# Patient Record
Sex: FEMALE | Race: WHITE | NOT HISPANIC OR LATINO
[De-identification: names, ages, dates, MRNs, and addresses within clinical notes are randomized per-mention and may not be internally consistent; named-entity substitution may affect disease eponyms.]

---

## 2020-12-21 ENCOUNTER — TRANSCRIPTION ENCOUNTER (OUTPATIENT)
Age: 35
End: 2020-12-21

## 2021-01-06 PROBLEM — Z00.00 ENCOUNTER FOR PREVENTIVE HEALTH EXAMINATION: Status: ACTIVE | Noted: 2021-01-06

## 2021-01-08 ENCOUNTER — APPOINTMENT (OUTPATIENT)
Dept: CARDIOLOGY | Facility: CLINIC | Age: 36
End: 2021-01-08
Payer: COMMERCIAL

## 2021-01-08 ENCOUNTER — NON-APPOINTMENT (OUTPATIENT)
Age: 36
End: 2021-01-08

## 2021-01-08 VITALS
WEIGHT: 135 LBS | DIASTOLIC BLOOD PRESSURE: 72 MMHG | HEIGHT: 67 IN | OXYGEN SATURATION: 100 % | HEART RATE: 64 BPM | SYSTOLIC BLOOD PRESSURE: 116 MMHG | BODY MASS INDEX: 21.19 KG/M2 | TEMPERATURE: 99.5 F | RESPIRATION RATE: 16 BRPM

## 2021-01-08 DIAGNOSIS — Z78.9 OTHER SPECIFIED HEALTH STATUS: ICD-10-CM

## 2021-01-08 DIAGNOSIS — G43.909 MIGRAINE, UNSPECIFIED, NOT INTRACTABLE, W/OUT STATUS MIGRAINOSUS: ICD-10-CM

## 2021-01-08 DIAGNOSIS — R00.2 PALPITATIONS: ICD-10-CM

## 2021-01-08 PROCEDURE — 99072 ADDL SUPL MATRL&STAF TM PHE: CPT

## 2021-01-08 PROCEDURE — 93000 ELECTROCARDIOGRAM COMPLETE: CPT

## 2021-01-08 PROCEDURE — 99203 OFFICE O/P NEW LOW 30 MIN: CPT

## 2021-01-08 RX ORDER — RIMEGEPANT SULFATE 75 MG/75MG
TABLET, ORALLY DISINTEGRATING ORAL
Refills: 0 | Status: ACTIVE | COMMUNITY

## 2021-01-08 NOTE — HISTORY OF PRESENT ILLNESS
[FreeTextEntry1] : Patient presents today having had fluttering over the last 3 weeks in her chest.  She notes it feels like 4-5 rapid beats in a row occurring at random.  This can occur with and without activity.  When it happens she feels a rush briefly and then feels back to normal.  No dizziness or lightheadedness.  It happens at least 3 times a day and has been stable since it began.  It is not getting worse but has not improved at all.  Unrelated to the heart fluttering she also has had chest pressure over the last few days.  This can last for hours at a time occurring predominantly at rest and often when she is laying down or in the evening.  She has tried taking some antacids without any real relief.  This is not associated with palpitations or shortness of breath or any other symptoms.  Patient denies shortness of breath, orthopnea, presyncope, syncope.

## 2021-01-08 NOTE — ASSESSMENT
[FreeTextEntry1] : EKG: Sinus rhythm with no significant ST or T wave changes.\par \par 35-year-old female with no significant past medical history who presents to me for evaluation of recent palpitations and chest pain.  Patient's chest discomfort sounds most likely GI in nature and I highly doubt any cardiac cause.  Her palpitation certainly could represent some type of arrhythmia and further work-up is necessary.  This may ultimately more likely be extrasystolic beating couple together but some form of SVT is also possible.

## 2021-01-08 NOTE — DISCUSSION/SUMMARY
[FreeTextEntry1] : 1.  Check Holter monitor to further evaluate her palpitations.\par 2.  Check echocardiogram and exercise stress test to evaluate her palpitations and chest pain.\par 3.  No additional cardiac medications at this time.\par 4.  Check routine blood work.\par 5.  She should try a PPI or H2 blocker to see if this helps with the chest pain.  Consider GI follow-up.\par 6.  Follow up here after testing, and will make further recommendations at that time.

## 2021-01-22 ENCOUNTER — APPOINTMENT (OUTPATIENT)
Dept: CARDIOLOGY | Facility: CLINIC | Age: 36
End: 2021-01-22
Payer: COMMERCIAL

## 2021-01-22 PROCEDURE — 93306 TTE W/DOPPLER COMPLETE: CPT

## 2021-01-22 PROCEDURE — 99072 ADDL SUPL MATRL&STAF TM PHE: CPT

## 2021-02-15 ENCOUNTER — APPOINTMENT (OUTPATIENT)
Dept: CARDIOLOGY | Facility: CLINIC | Age: 36
End: 2021-02-15

## 2021-03-18 ENCOUNTER — TRANSCRIPTION ENCOUNTER (OUTPATIENT)
Age: 36
End: 2021-03-18

## 2021-03-31 ENCOUNTER — TRANSCRIPTION ENCOUNTER (OUTPATIENT)
Age: 36
End: 2021-03-31

## 2021-04-01 ENCOUNTER — APPOINTMENT (OUTPATIENT)
Dept: CARDIOLOGY | Facility: CLINIC | Age: 36
End: 2021-04-01

## 2021-09-12 ENCOUNTER — TRANSCRIPTION ENCOUNTER (OUTPATIENT)
Age: 36
End: 2021-09-12

## 2023-01-20 ENCOUNTER — APPOINTMENT (OUTPATIENT)
Dept: CARDIOLOGY | Facility: CLINIC | Age: 38
End: 2023-01-20
Payer: COMMERCIAL

## 2023-01-20 ENCOUNTER — NON-APPOINTMENT (OUTPATIENT)
Age: 38
End: 2023-01-20

## 2023-01-20 VITALS
HEART RATE: 53 BPM | SYSTOLIC BLOOD PRESSURE: 120 MMHG | BODY MASS INDEX: 21.03 KG/M2 | HEIGHT: 67 IN | DIASTOLIC BLOOD PRESSURE: 70 MMHG | RESPIRATION RATE: 16 BRPM | WEIGHT: 134 LBS

## 2023-01-20 DIAGNOSIS — R07.89 OTHER CHEST PAIN: ICD-10-CM

## 2023-01-20 PROCEDURE — 99214 OFFICE O/P EST MOD 30 MIN: CPT | Mod: 25

## 2023-01-20 PROCEDURE — 93000 ELECTROCARDIOGRAM COMPLETE: CPT
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
## 2023-01-20 NOTE — REASON FOR VISIT
[FreeTextEntry1] : The patient is a 37-year-old white female who was evaluated in our office approximately 2 years ago for some intermittent palpitations.  At that time she underwent some cardiac testing including an echocardiogram and cardiac event monitor.  The study demonstrated only occasional PACs and PVCs and no significant findings.  Her symptoms improved spontaneously;\par \par She presents back to the office today urgently because of some anterior chest pressure tightness she has been experiencing since awakening this morning;\par \par There does not appear to be any significant associated shortness of breath, palpitations but she has felt a little dizziness and lightheadedness;

## 2023-01-20 NOTE — ASSESSMENT
[FreeTextEntry1] : EKG shows sinus bradycardia at a rate of 53.  There is RSR prime in V1 and V2 but otherwise no acute changes;\par \par \par In summary, this 37-year-old woman has had a history of some mid anterior chest pressure discomfort since early this morning.  She demonstrates a nonischemic appearing EKG today in the office and there is some pleuritic type discomfort described; suspect possible costochondritis of the chest wall or rib cage;\par \par To rule out any evidence of pericarditis or myocarditis I have recommended the following:\par \par Plan:\par \par Schedule transthoracic echocardiogram in the next couple of days;\par \par Recommend NSAIDs 2-3 times per day as needed\par \par Warm compress to chest wall as discussed as needed;\par \par Patient to report any change in character or severity of symptoms between now and review of testing in the near future;\par \par Return to office as needed;

## 2023-02-07 ENCOUNTER — APPOINTMENT (OUTPATIENT)
Dept: CARDIOLOGY | Facility: CLINIC | Age: 38
End: 2023-02-07
Payer: COMMERCIAL

## 2023-02-07 PROCEDURE — 93306 TTE W/DOPPLER COMPLETE: CPT

## 2023-04-23 ENCOUNTER — NON-APPOINTMENT (OUTPATIENT)
Age: 38
End: 2023-04-23

## 2024-03-31 ENCOUNTER — NON-APPOINTMENT (OUTPATIENT)
Age: 39
End: 2024-03-31

## 2024-09-30 ENCOUNTER — NON-APPOINTMENT (OUTPATIENT)
Age: 39
End: 2024-09-30

## 2024-12-15 ENCOUNTER — NON-APPOINTMENT (OUTPATIENT)
Age: 39
End: 2024-12-15

## 2025-01-31 ENCOUNTER — NON-APPOINTMENT (OUTPATIENT)
Age: 40
End: 2025-01-31

## 2025-02-09 ENCOUNTER — NON-APPOINTMENT (OUTPATIENT)
Age: 40
End: 2025-02-09

## 2025-03-28 NOTE — REVIEW OF SYSTEMS
[facility] APPOINTMENT CONFIRMATION      Date: 04/04/2025    Time: 8:30 am    Provider name: Dr. Min Morrow    Location: Neurology Locations: [address] [place] [address]    Zoom link sent (if applicable): N/A    Will patient be in [place] for the virtual visit? N/A    Is patient currently in a facility? No    Alternative contact information: none    Appointment confirmed: Yes    \"Please ensure you bring the medication bottles, including the dates and times you take each medication.\"  New Patients: \"Please bring any outside records or images.\"    \"We do have free parking available in the main hospital parking lot. However, parking can be difficult to find so we ask that you arrive 40 minutes prior to your appointment.\"       [Chest Discomfort] : chest discomfort [Dizziness] : dizziness [Negative] : Heme/Lymph

## 2025-05-21 ENCOUNTER — NON-APPOINTMENT (OUTPATIENT)
Age: 40
End: 2025-05-21